# Patient Record
Sex: MALE | Race: WHITE | Employment: PART TIME | URBAN - METROPOLITAN AREA
[De-identification: names, ages, dates, MRNs, and addresses within clinical notes are randomized per-mention and may not be internally consistent; named-entity substitution may affect disease eponyms.]

---

## 2017-04-18 PROBLEM — R60.0 BILATERAL EDEMA OF LOWER EXTREMITY: Status: ACTIVE | Noted: 2017-04-18

## 2017-10-24 PROBLEM — I27.20 PULMONARY HYPERTENSION (HCC): Status: ACTIVE | Noted: 2017-10-24

## 2018-01-03 PROBLEM — I38 VALVULAR HEART DISEASE: Status: ACTIVE | Noted: 2018-01-03

## 2018-09-21 ENCOUNTER — HOSPITAL ENCOUNTER (OUTPATIENT)
Age: 68
Setting detail: SPECIMEN
Discharge: HOME | End: 2018-09-21
Payer: MEDICARE

## 2018-09-25 LAB — SURGICAL PATHOLOGY REPORT: NORMAL

## 2018-10-26 PROBLEM — R93.1 ABNORMAL ECHOCARDIOGRAM: Status: ACTIVE | Noted: 2018-10-26

## 2018-10-26 PROBLEM — I51.7 LAE (LEFT ATRIAL ENLARGEMENT): Status: ACTIVE | Noted: 2018-10-26

## 2018-10-26 PROBLEM — I77.810 DILATED AORTIC ROOT (HCC): Status: ACTIVE | Noted: 2018-10-26

## 2019-04-26 PROBLEM — I51.7 LVH (LEFT VENTRICULAR HYPERTROPHY): Status: ACTIVE | Noted: 2019-04-26

## 2019-06-13 PROBLEM — I48.92 ATRIAL FLUTTER, PAROXYSMAL (HCC): Status: ACTIVE | Noted: 2019-06-13

## 2019-06-13 PROBLEM — Z79.01 CURRENT USE OF LONG TERM ANTICOAGULATION: Status: ACTIVE | Noted: 2019-06-13

## 2020-07-24 PROBLEM — I49.3 PVC (PREMATURE VENTRICULAR CONTRACTION): Status: ACTIVE | Noted: 2020-07-24

## 2020-07-24 PROBLEM — I42.8 NONISCHEMIC CARDIOMYOPATHY (HCC): Status: ACTIVE | Noted: 2020-07-24

## 2020-07-24 PROBLEM — I47.29 NSVT (NONSUSTAINED VENTRICULAR TACHYCARDIA): Status: ACTIVE | Noted: 2020-07-24

## 2023-12-12 RX ORDER — FENOFIBRATE 160 MG/1
160 TABLET ORAL DAILY
Qty: 90 TABLET | Refills: 3 | OUTPATIENT
Start: 2023-12-12

## 2024-02-24 DIAGNOSIS — F51.01 PRIMARY INSOMNIA: Primary | ICD-10-CM

## 2024-02-26 NOTE — TELEPHONE ENCOUNTER
Patient is Out of Medication and leaving today to go to [place] to see his Dtr-in-law who is on life support.  Please send today.

## 2024-02-26 NOTE — TELEPHONE ENCOUNTER
Andrew Restrepo is calling to request a refill on the following medication(s):    Last Visit Date (If Applicable):  Visit date not found    Next Visit Date:    Visit date not found    Medication Request:  Requested Prescriptions     Pending Prescriptions Disp Refills    zolpidem (AMBIEN) 10 MG tablet [Pharmacy Med Name: ZOLPIDEM TARTRATE 10 MG TABLET] 30 tablet 1     Sig: take 1 tablet by mouth once daily at bedtime if needed for insomnia

## 2024-02-27 ENCOUNTER — OFFICE VISIT (OUTPATIENT)
Age: 74
End: 2024-02-27
Payer: MEDICARE

## 2024-02-27 VITALS
HEART RATE: 75 BPM | DIASTOLIC BLOOD PRESSURE: 90 MMHG | HEIGHT: 69 IN | TEMPERATURE: 97.6 F | SYSTOLIC BLOOD PRESSURE: 142 MMHG | OXYGEN SATURATION: 96 % | BODY MASS INDEX: 30.36 KG/M2 | WEIGHT: 205 LBS

## 2024-02-27 DIAGNOSIS — Z96.643 HISTORY OF BILATERAL HIP REPLACEMENTS: ICD-10-CM

## 2024-02-27 DIAGNOSIS — Z00.00 MEDICARE ANNUAL WELLNESS VISIT, SUBSEQUENT: Primary | ICD-10-CM

## 2024-02-27 DIAGNOSIS — Z79.01 ANTICOAGULANT LONG-TERM USE: ICD-10-CM

## 2024-02-27 DIAGNOSIS — R73.9 HYPERGLYCEMIA: ICD-10-CM

## 2024-02-27 DIAGNOSIS — I10 ESSENTIAL HYPERTENSION: ICD-10-CM

## 2024-02-27 DIAGNOSIS — Z95.2 HISTORY OF MITRAL VALVE REPLACEMENT: ICD-10-CM

## 2024-02-27 DIAGNOSIS — E55.9 VITAMIN D DEFICIENCY: ICD-10-CM

## 2024-02-27 DIAGNOSIS — Z95.810 AICD (AUTOMATIC CARDIOVERTER/DEFIBRILLATOR) PRESENT: ICD-10-CM

## 2024-02-27 DIAGNOSIS — E78.2 MIXED HYPERLIPIDEMIA: ICD-10-CM

## 2024-02-27 DIAGNOSIS — I48.11 LONGSTANDING PERSISTENT ATRIAL FIBRILLATION (HCC): ICD-10-CM

## 2024-02-27 DIAGNOSIS — F41.9 ANXIETY: ICD-10-CM

## 2024-02-27 DIAGNOSIS — F51.01 PRIMARY INSOMNIA: ICD-10-CM

## 2024-02-27 DIAGNOSIS — K21.9 GERD WITHOUT ESOPHAGITIS: ICD-10-CM

## 2024-02-27 PROCEDURE — 3080F DIAST BP >= 90 MM HG: CPT | Performed by: INTERNAL MEDICINE

## 2024-02-27 PROCEDURE — G0439 PPPS, SUBSEQ VISIT: HCPCS | Performed by: INTERNAL MEDICINE

## 2024-02-27 PROCEDURE — 3077F SYST BP >= 140 MM HG: CPT | Performed by: INTERNAL MEDICINE

## 2024-02-27 PROCEDURE — 3017F COLORECTAL CA SCREEN DOC REV: CPT | Performed by: INTERNAL MEDICINE

## 2024-02-27 PROCEDURE — 1123F ACP DISCUSS/DSCN MKR DOCD: CPT | Performed by: INTERNAL MEDICINE

## 2024-02-27 PROCEDURE — 99214 OFFICE O/P EST MOD 30 MIN: CPT | Performed by: INTERNAL MEDICINE

## 2024-02-27 PROCEDURE — G8484 FLU IMMUNIZE NO ADMIN: HCPCS | Performed by: INTERNAL MEDICINE

## 2024-02-27 RX ORDER — ZOLPIDEM TARTRATE 10 MG/1
TABLET ORAL
Qty: 5 TABLET | Refills: 0 | Status: SHIPPED | OUTPATIENT
Start: 2024-02-27 | End: 2024-03-01

## 2024-02-27 RX ORDER — SOTALOL HYDROCHLORIDE 80 MG/1
80 TABLET ORAL 2 TIMES DAILY
COMMUNITY

## 2024-02-27 RX ORDER — PANTOPRAZOLE SODIUM 40 MG/1
40 TABLET, DELAYED RELEASE ORAL
Qty: 180 TABLET | Refills: 1 | Status: SHIPPED | OUTPATIENT
Start: 2024-02-27

## 2024-02-27 SDOH — ECONOMIC STABILITY: INCOME INSECURITY: HOW HARD IS IT FOR YOU TO PAY FOR THE VERY BASICS LIKE FOOD, HOUSING, MEDICAL CARE, AND HEATING?: NOT VERY HARD

## 2024-02-27 SDOH — ECONOMIC STABILITY: HOUSING INSECURITY
IN THE LAST 12 MONTHS, WAS THERE A TIME WHEN YOU DID NOT HAVE A STEADY PLACE TO SLEEP OR SLEPT IN A SHELTER (INCLUDING NOW)?: NO

## 2024-02-27 SDOH — ECONOMIC STABILITY: FOOD INSECURITY: WITHIN THE PAST 12 MONTHS, THE FOOD YOU BOUGHT JUST DIDN'T LAST AND YOU DIDN'T HAVE MONEY TO GET MORE.: NEVER TRUE

## 2024-02-27 SDOH — ECONOMIC STABILITY: FOOD INSECURITY: WITHIN THE PAST 12 MONTHS, YOU WORRIED THAT YOUR FOOD WOULD RUN OUT BEFORE YOU GOT MONEY TO BUY MORE.: NEVER TRUE

## 2024-02-27 ASSESSMENT — ENCOUNTER SYMPTOMS
VOMITING: 0
EYE PAIN: 0
CONSTIPATION: 0
WHEEZING: 0
SINUS PAIN: 0
DIARRHEA: 0
SORE THROAT: 0
COUGH: 0
ABDOMINAL PAIN: 0
RHINORRHEA: 0
NAUSEA: 0
SINUS PRESSURE: 0
SHORTNESS OF BREATH: 0
BACK PAIN: 0
BLOOD IN STOOL: 0
EYE REDNESS: 0

## 2024-02-27 ASSESSMENT — PATIENT HEALTH QUESTIONNAIRE - PHQ9
SUM OF ALL RESPONSES TO PHQ QUESTIONS 1-9: 0
1. LITTLE INTEREST OR PLEASURE IN DOING THINGS: 0
2. FEELING DOWN, DEPRESSED OR HOPELESS: 0
SUM OF ALL RESPONSES TO PHQ QUESTIONS 1-9: 0
SUM OF ALL RESPONSES TO PHQ QUESTIONS 1-9: 0
SUM OF ALL RESPONSES TO PHQ9 QUESTIONS 1 & 2: 0
SUM OF ALL RESPONSES TO PHQ QUESTIONS 1-9: 0

## 2024-02-27 ASSESSMENT — LIFESTYLE VARIABLES
HOW OFTEN DO YOU HAVE A DRINK CONTAINING ALCOHOL: NEVER
HOW MANY STANDARD DRINKS CONTAINING ALCOHOL DO YOU HAVE ON A TYPICAL DAY: PATIENT DOES NOT DRINK

## 2024-02-27 NOTE — PATIENT INSTRUCTIONS
you have questions about a medical condition or this instruction, always ask your healthcare professional. Healthwise, [facility] disclaims any warranty or liability for your use of this information.      Personalized Preventive Plan for Andrew Restrepo - 2/27/2024  Medicare offers a range of preventive health benefits. Some of the tests and screenings are paid in full while other may be subject to a deductible, co-insurance, and/or copay.    Some of these benefits include a comprehensive review of your medical history including lifestyle, illnesses that may run in your family, and various assessments and screenings as appropriate.    After reviewing your medical record and screening and assessments performed today your provider may have ordered immunizations, labs, imaging, and/or referrals for you.  A list of these orders (if applicable) as well as your Preventive Care list are included within your After Visit Summary for your review.    Other Preventive Recommendations:    A preventive eye exam performed by an eye specialist is recommended every 1-2 years to screen for glaucoma; cataracts, macular degeneration, and other eye disorders.  A preventive dental visit is recommended every 6 months.  Try to get at least 150 minutes of exercise per week or 10,000 steps per day on a pedometer .  Order or download the FREE \"Exercise & Physical Activity: Your Everyday Guide\" from [employer]. Call 1-216.702.1516 or search [employer] online.  You need 3918-0564 mg of calcium and 6608-1491 IU of vitamin D per day. It is possible to meet your calcium requirement with diet alone, but a vitamin D supplement is usually necessary to meet this goal.  When exposed to the sun, use a sunscreen that protects against both UVA and UVB radiation with an SPF of 30 or greater. Reapply every 2 to 3 hours or after sweating, drying off with a towel, or swimming.  Always wear a seat belt when traveling

## 2024-02-27 NOTE — PROGRESS NOTES
(Temporal)   Ht 1.753 m (5' 9\")   Wt 93 kg (205 lb)   SpO2 96%   BMI 30.27 kg/m²    Physical Exam  Vitals reviewed.   Constitutional:       General: He is not in acute distress.     Appearance: Normal appearance.   HENT:      Head: Normocephalic and atraumatic.      Right Ear: Tympanic membrane and external ear normal.      Left Ear: Tympanic membrane and external ear normal.      Nose: Nose normal.      Mouth/Throat:      Mouth: Mucous membranes are moist.      Pharynx: No oropharyngeal exudate or posterior oropharyngeal erythema.   Eyes:      Extraocular Movements: Extraocular movements intact.      Conjunctiva/sclera: Conjunctivae normal.      Pupils: Pupils are equal, round, and reactive to light.   Neck:      Vascular: No carotid bruit.   Cardiovascular:      Rate and Rhythm: Normal rate and regular rhythm.      Pulses: Normal pulses.      Heart sounds: No murmur heard.     No friction rub. No gallop.   Pulmonary:      Effort: Pulmonary effort is normal. No respiratory distress.      Breath sounds: Normal breath sounds. No wheezing, rhonchi or rales.   Abdominal:      General: Bowel sounds are normal. There is no distension.      Palpations: Abdomen is soft. There is no mass.      Tenderness: There is no abdominal tenderness. There is no guarding.   Musculoskeletal:         General: No swelling or deformity. Normal range of motion.      Cervical back: Normal range of motion and neck supple. No rigidity.   Lymphadenopathy:      Cervical: No cervical adenopathy.   Skin:     General: Skin is warm.      Coloration: Skin is not jaundiced or pale.      Findings: No bruising or rash.   Neurological:      General: No focal deficit present.      Mental Status: He is alert and oriented to person, place, and time.      Cranial Nerves: No cranial nerve deficit.      Motor: No weakness.      Gait: Gait normal.      Deep Tendon Reflexes: Reflexes normal.   Psychiatric:         Mood and Affect: Mood normal.

## 2024-02-27 NOTE — ADDENDUM NOTE
Addended by: BRETT LOMBARDO on: 2/27/2024 03:50 PM     Modules accepted: Orders, Level of Service

## 2024-02-29 RX ORDER — FENOFIBRATE 160 MG/1
160 TABLET ORAL DAILY
Qty: 90 TABLET | Refills: 3 | Status: SHIPPED | OUTPATIENT
Start: 2024-02-29

## 2024-02-29 NOTE — TELEPHONE ENCOUNTER
Andrew Restrepo is calling to request a refill on the following medication(s):    Last Visit Date (If Applicable):  Visit date not found    Next Visit Date:    6/4/2024    Medication Request:  Requested Prescriptions     Pending Prescriptions Disp Refills    fenofibrate (TRIGLIDE) 160 MG tablet [Pharmacy Med Name: Fenofibrate 160 MG Oral Tablet] 90 tablet 3     Sig: TAKE 1 TABLET BY MOUTH DAILY

## 2024-02-29 NOTE — TELEPHONE ENCOUNTER
Patient wants this sent to [facility]       fenofibrate 160 MG tablet  Take 1 tablet by mouth daily, Last Dose:

## 2024-03-01 DIAGNOSIS — F51.01 PRIMARY INSOMNIA: ICD-10-CM

## 2024-03-01 RX ORDER — ZOLPIDEM TARTRATE 10 MG/1
TABLET ORAL
Qty: 30 TABLET | Refills: 2 | Status: SHIPPED | OUTPATIENT
Start: 2024-03-01 | End: 2024-04-01

## 2024-03-01 NOTE — TELEPHONE ENCOUNTER
Patient needs his medication prior to leaving tonight. Please send more than 5 tablets if possible. Patient was just seen in office this week.

## 2024-03-01 NOTE — TELEPHONE ENCOUNTER
Andrew Restrepo is calling to request a refill on the following medication(s):    Last Visit Date (If Applicable):  2/27/2024    Next Visit Date:    Visit date not found    Medication Request:  Requested Prescriptions     Pending Prescriptions Disp Refills    zolpidem (AMBIEN) 10 MG tablet [Pharmacy Med Name: ZOLPIDEM TARTRATE 10 MG TABLET] 30 tablet 1     Sig: take 1 tablet by mouth once daily at bedtime if needed for insomnia

## 2024-04-08 LAB
CHOLESTEROL, TOTAL: 146 MG/DL
CHOLESTEROL/HDL RATIO: 2.8
CREATININE, URINE: 207.31
ESTIMATED AVERAGE GLUCOSE: 146
HBA1C MFR BLD: 6.7 %
HDLC SERPL-MCNC: 52 MG/DL (ref 35–70)
LDL CHOLESTEROL CALCULATED: 70 MG/DL (ref 0–160)
MICROALBUMIN/CREAT 24H UR: 1.3 MG/G{CREAT}
MICROALBUMIN/CREAT UR-RTO: 6.3
NONHDLC SERPL-MCNC: NORMAL MG/DL
TRIGL SERPL-MCNC: 120 MG/DL
TSH SERPL DL<=0.05 MIU/L-ACNC: 4.95 UIU/ML
VITAMIN B-12: 543
VLDLC SERPL CALC-MCNC: 24 MG/DL

## 2024-04-09 DIAGNOSIS — R73.9 HYPERGLYCEMIA: ICD-10-CM

## 2024-04-09 DIAGNOSIS — I10 ESSENTIAL HYPERTENSION: ICD-10-CM

## 2024-04-09 DIAGNOSIS — E11.65 TYPE 2 DIABETES MELLITUS WITH HYPERGLYCEMIA (HCC): ICD-10-CM

## 2024-04-09 DIAGNOSIS — E78.2 MIXED HYPERLIPIDEMIA: ICD-10-CM

## 2024-04-15 RX ORDER — GLUCOSAMINE HCL/CHONDROITIN SU 500-400 MG
CAPSULE ORAL
Qty: 100 STRIP | Refills: 3 | Status: SHIPPED | OUTPATIENT
Start: 2024-04-15

## 2024-04-15 RX ORDER — BLOOD-GLUCOSE METER
1 KIT MISCELLANEOUS DAILY
Qty: 1 KIT | Refills: 0 | Status: SHIPPED | OUTPATIENT
Start: 2024-04-15

## 2024-04-15 RX ORDER — LANCETS 30 GAUGE
1 EACH MISCELLANEOUS DAILY
Qty: 100 EACH | Refills: 3 | Status: SHIPPED | OUTPATIENT
Start: 2024-04-15

## 2024-04-24 DIAGNOSIS — F41.9 ANXIETY: Primary | ICD-10-CM

## 2024-04-24 RX ORDER — CITALOPRAM 40 MG/1
40 TABLET ORAL DAILY
Qty: 90 TABLET | Refills: 1 | Status: SHIPPED | OUTPATIENT
Start: 2024-04-24

## 2024-04-24 NOTE — TELEPHONE ENCOUNTER
Andrew Restrepo is calling to request a refill on the following medication(s):    Last Visit Date (If Applicable):  Visit date not found    Next Visit Date:    6/4/2024    Medication Request:  Requested Prescriptions     Pending Prescriptions Disp Refills    citalopram (CELEXA) 40 MG tablet [Pharmacy Med Name: Citalopram Hydrobromide 40 MG Oral Tablet] 90 tablet 3     Sig: TAKE 1 TABLET BY MOUTH DAILY

## 2024-05-28 RX ORDER — FENOFIBRATE 160 MG/1
160 TABLET ORAL DAILY
Qty: 90 TABLET | Refills: 3 | Status: SHIPPED | OUTPATIENT
Start: 2024-05-28

## 2024-05-28 NOTE — TELEPHONE ENCOUNTER
Andrew Restrepo is calling to request a refill on the following medication(s):    Medication Request:  Requested Prescriptions     Pending Prescriptions Disp Refills    fenofibrate (TRIGLIDE) 160 MG tablet [Pharmacy Med Name: Fenofibrate 160 MG Oral Tablet] 90 tablet 3     Sig: TAKE 1 TABLET BY MOUTH DAILY       Last Visit Date (If Applicable):  2/27/24    Next Visit Date:    6/4/2024

## 2024-05-30 DIAGNOSIS — F51.01 PRIMARY INSOMNIA: ICD-10-CM

## 2024-05-30 RX ORDER — ZOLPIDEM TARTRATE 10 MG/1
10 TABLET ORAL NIGHTLY PRN
Qty: 30 TABLET | Refills: 0 | Status: SHIPPED | OUTPATIENT
Start: 2024-05-30 | End: 2024-06-28

## 2024-05-30 NOTE — TELEPHONE ENCOUNTER
Andrew Restrepo is calling to request a refill on the following medication(s):    Last Visit Date (If Applicable):  2/27/2024    Next Visit Date:    Visit date not found    Medication Request:  Requested Prescriptions     Pending Prescriptions Disp Refills    zolpidem (AMBIEN) 10 MG tablet [Pharmacy Med Name: ZOLPIDEM TARTRATE 10 MG TABLET] 30 tablet      Sig: take 1 tablet by mouth once daily at bedtime if needed for insomnia

## 2024-06-04 ENCOUNTER — OFFICE VISIT (OUTPATIENT)
Age: 74
End: 2024-06-04
Payer: MEDICARE

## 2024-06-04 VITALS
HEART RATE: 62 BPM | HEIGHT: 69 IN | OXYGEN SATURATION: 96 % | SYSTOLIC BLOOD PRESSURE: 135 MMHG | BODY MASS INDEX: 29.62 KG/M2 | WEIGHT: 200 LBS | TEMPERATURE: 97.1 F | DIASTOLIC BLOOD PRESSURE: 80 MMHG

## 2024-06-04 DIAGNOSIS — J32.0 CHRONIC MAXILLARY SINUSITIS: Primary | ICD-10-CM

## 2024-06-04 DIAGNOSIS — F51.01 PRIMARY INSOMNIA: ICD-10-CM

## 2024-06-04 DIAGNOSIS — Z79.01 ANTICOAGULANT LONG-TERM USE: ICD-10-CM

## 2024-06-04 DIAGNOSIS — Z95.810 AICD (AUTOMATIC CARDIOVERTER/DEFIBRILLATOR) PRESENT: ICD-10-CM

## 2024-06-04 DIAGNOSIS — C43.30 MALIGNANT MELANOMA OF FACE EXCLUDING EYELID, NOSE, LIP, AND EAR (HCC): ICD-10-CM

## 2024-06-04 DIAGNOSIS — F41.9 ANXIETY: ICD-10-CM

## 2024-06-04 DIAGNOSIS — E11.9 TYPE 2 DIABETES MELLITUS WITHOUT COMPLICATION, WITHOUT LONG-TERM CURRENT USE OF INSULIN (HCC): ICD-10-CM

## 2024-06-04 DIAGNOSIS — K21.9 GERD WITHOUT ESOPHAGITIS: ICD-10-CM

## 2024-06-04 DIAGNOSIS — I10 ESSENTIAL HYPERTENSION: ICD-10-CM

## 2024-06-04 DIAGNOSIS — B37.0 THRUSH: ICD-10-CM

## 2024-06-04 DIAGNOSIS — E78.2 MIXED HYPERLIPIDEMIA: ICD-10-CM

## 2024-06-04 DIAGNOSIS — Z95.2 HISTORY OF MITRAL VALVE REPLACEMENT: ICD-10-CM

## 2024-06-04 DIAGNOSIS — I48.11 LONGSTANDING PERSISTENT ATRIAL FIBRILLATION (HCC): ICD-10-CM

## 2024-06-04 PROBLEM — I49.3 PVC (PREMATURE VENTRICULAR CONTRACTION): Status: RESOLVED | Noted: 2020-07-24 | Resolved: 2024-06-04

## 2024-06-04 PROBLEM — I51.7 LVH (LEFT VENTRICULAR HYPERTROPHY): Status: RESOLVED | Noted: 2019-04-26 | Resolved: 2024-06-04

## 2024-06-04 PROBLEM — I47.29 NSVT (NONSUSTAINED VENTRICULAR TACHYCARDIA) (HCC): Status: RESOLVED | Noted: 2020-07-24 | Resolved: 2024-06-04

## 2024-06-04 PROBLEM — I51.7 LAE (LEFT ATRIAL ENLARGEMENT): Status: RESOLVED | Noted: 2018-10-26 | Resolved: 2024-06-04

## 2024-06-04 PROBLEM — I77.810 DILATED AORTIC ROOT (HCC): Status: RESOLVED | Noted: 2018-10-26 | Resolved: 2024-06-04

## 2024-06-04 PROBLEM — R93.1 ABNORMAL ECHOCARDIOGRAM: Status: RESOLVED | Noted: 2018-10-26 | Resolved: 2024-06-04

## 2024-06-04 PROBLEM — I48.92 ATRIAL FLUTTER, PAROXYSMAL (HCC): Status: RESOLVED | Noted: 2019-06-13 | Resolved: 2024-06-04

## 2024-06-04 PROBLEM — I38 VALVULAR HEART DISEASE: Status: RESOLVED | Noted: 2018-01-03 | Resolved: 2024-06-04

## 2024-06-04 PROCEDURE — 3079F DIAST BP 80-89 MM HG: CPT | Performed by: PHYSICIAN ASSISTANT

## 2024-06-04 PROCEDURE — 3017F COLORECTAL CA SCREEN DOC REV: CPT | Performed by: PHYSICIAN ASSISTANT

## 2024-06-04 PROCEDURE — 3044F HG A1C LEVEL LT 7.0%: CPT | Performed by: PHYSICIAN ASSISTANT

## 2024-06-04 PROCEDURE — 2022F DILAT RTA XM EVC RTNOPTHY: CPT | Performed by: PHYSICIAN ASSISTANT

## 2024-06-04 PROCEDURE — 99214 OFFICE O/P EST MOD 30 MIN: CPT | Performed by: PHYSICIAN ASSISTANT

## 2024-06-04 PROCEDURE — G8427 DOCREV CUR MEDS BY ELIG CLIN: HCPCS | Performed by: PHYSICIAN ASSISTANT

## 2024-06-04 PROCEDURE — 3075F SYST BP GE 130 - 139MM HG: CPT | Performed by: PHYSICIAN ASSISTANT

## 2024-06-04 PROCEDURE — G8417 CALC BMI ABV UP PARAM F/U: HCPCS | Performed by: PHYSICIAN ASSISTANT

## 2024-06-04 PROCEDURE — 1036F TOBACCO NON-USER: CPT | Performed by: PHYSICIAN ASSISTANT

## 2024-06-04 PROCEDURE — 1123F ACP DISCUSS/DSCN MKR DOCD: CPT | Performed by: PHYSICIAN ASSISTANT

## 2024-06-04 RX ORDER — LOSARTAN POTASSIUM 50 MG/1
50 TABLET ORAL DAILY
COMMUNITY

## 2024-06-04 RX ORDER — ATORVASTATIN CALCIUM 40 MG/1
40 TABLET, FILM COATED ORAL DAILY
COMMUNITY

## 2024-06-04 ASSESSMENT — ENCOUNTER SYMPTOMS
NAUSEA: 0
SHORTNESS OF BREATH: 0
EYE PAIN: 0
SORE THROAT: 0
RHINORRHEA: 0
SINUS PAIN: 0
COUGH: 0
VOMITING: 0
SINUS PRESSURE: 0
BACK PAIN: 0
BLOOD IN STOOL: 0
CONSTIPATION: 0
EYE REDNESS: 0
DIARRHEA: 0
ABDOMINAL PAIN: 0
WHEEZING: 0

## 2024-06-04 NOTE — PROGRESS NOTES
TSH 4.95 uIU/mL   Vitamin B12   Result Value Ref Range    Vitamin B-12 543    Lipid Panel   Result Value Ref Range    Cholesterol, Total 146 mg/dL    HDL 52 35 - 70 mg/dL    LDL Calculated 70 0 - 160 mg/dL    Triglycerides 120 mg/dL    Chol/HDL Ratio 2.8     VLDL 24 mg/dL    Cholesterol non HDL        ASSESSMENT/PLAN     1. Chronic maxillary sinusitis  -     CT SINUS WO CONTRAST; Future  Patient was treated for chronic sinusitis by an ENT in 2021 with several courses of antibiotics that did not really resolve his symptoms.  Will check a CT of the sinuses now to see if there is still any infection.  2. Thrush  -     nystatin (MYCOSTATIN) 205333 UNIT/ML suspension; Take 5 mLs by mouth 4 times daily for 10 days Retain in mouth as long as possible, Oral, 4 TIMES DAILY Starting Tue 6/4/2024, Until Fri 6/14/2024, For 10 days, Disp-200 mL, R-0, Normal  Patient may have thrush on his tongue because there does seem to be thick coating.  He did have Sjogren's antibody test done through ENT a year or 2 ago that was negative that I found on his old [employer] system.  He also recently had vitamin B12 is negative.  3. Malignant melanoma of face excluding eyelid, nose, lip, and ear (HCC)  Recent excision of malignant melanoma by plastic surgeon left temple area  4. Primary insomnia  [place] report done.  Controlled substance contract updated.  No red flags.  Uses Ambien as needed  5. Anxiety  Taking citalopram 40 mg daily  6. Mixed hyperlipidemia  Patient brought his medication bottles with him today the atorvastatin on our chart was listed incorrectly, he is actually on 40 mg daily so chart updated.  He is also on fenofibrate 160 mg daily.  7. Essential hypertension  Losartan was also listed incorrectly he is actually taking 50 mg once a day per Dr. Peggy Ingram.  Also on sotalol 80 mg twice a day  8. Anticoagulant long-term use  Warfarin managed by cardiology  9. Longstanding persistent atrial fibrillation (HCC)  On warfarin per

## 2024-06-06 ENCOUNTER — HOSPITAL ENCOUNTER (OUTPATIENT)
Dept: CT IMAGING | Age: 74
Discharge: HOME | End: 2024-06-08
Attending: PHYSICIAN ASSISTANT
Payer: MEDICARE

## 2024-06-06 DIAGNOSIS — J32.0 CHRONIC MAXILLARY SINUSITIS: ICD-10-CM

## 2024-06-06 PROCEDURE — 70486 CT MAXILLOFACIAL W/O DYE: CPT

## 2024-06-13 ENCOUNTER — TELEPHONE (OUTPATIENT)
Age: 74
End: 2024-06-13

## 2024-06-13 DIAGNOSIS — J32.0 CHRONIC MAXILLARY SINUSITIS: Primary | ICD-10-CM

## 2024-06-13 NOTE — TELEPHONE ENCOUNTER
Patient would like the ENT referral sent to DR Cassius Ornelas.   He does not feel confident with the provider we referred him to.  Please resend to Dr Cassius Ornelas.

## 2024-06-17 ENCOUNTER — TELEPHONE (OUTPATIENT)
Age: 74
End: 2024-06-17

## 2024-06-17 DIAGNOSIS — B37.0 THRUSH: Primary | ICD-10-CM

## 2024-06-17 NOTE — TELEPHONE ENCOUNTER
Patient states that Nystatin 500,000 Units is out of stock at [facility] and the pharmacy does not think they will get it in. Patient would like to know if there is an alternative medication. In the meantime patient will call around to to other pharmacies.    [facility] in [place]  has it for 60 milligrams at 4 times a day.   Phone:[phone]

## 2024-06-17 NOTE — TELEPHONE ENCOUNTER
Patient states that Nystatin 100,000 Units is available at [facility]. Please send to [facility].  Can it still be sent to [facility] or wait till he sees the ENT? Thank you.

## 2024-06-20 ENCOUNTER — TELEPHONE (OUTPATIENT)
Age: 74
End: 2024-06-20

## 2024-06-20 DIAGNOSIS — J32.0 CHRONIC MAXILLARY SINUSITIS: Primary | ICD-10-CM

## 2024-06-20 NOTE — TELEPHONE ENCOUNTER
Patient states that Dr Ornelas moved out of the area about three years ago. Patient would like an ENT Referral sent to Dr Muñiz or anyone within the [facility] system that we recommend.  Please call the patient when ready.

## 2024-06-28 DIAGNOSIS — F51.01 PRIMARY INSOMNIA: ICD-10-CM

## 2024-06-28 RX ORDER — ZOLPIDEM TARTRATE 10 MG/1
10 TABLET ORAL NIGHTLY PRN
Qty: 30 TABLET | Refills: 3 | Status: SHIPPED | OUTPATIENT
Start: 2024-06-28 | End: 2024-10-26

## 2024-06-28 NOTE — TELEPHONE ENCOUNTER
Andrew Restrepo is calling to request a refill on the following medication(s):    Last Visit Date (If Applicable):  6/4/2024    Next Visit Date:    9/6/2024    Medication Request:  Requested Prescriptions     Pending Prescriptions Disp Refills    zolpidem (AMBIEN) 10 MG tablet [Pharmacy Med Name: ZOLPIDEM TARTRATE 10 MG TABLET] 30 tablet 0     Sig: Take 1 tablet by mouth nightly as needed for Sleep. Max Daily Amount: 10 mg

## 2024-08-27 ENCOUNTER — TELEMEDICINE (OUTPATIENT)
Age: 74
End: 2024-08-27
Payer: MEDICARE

## 2024-08-27 DIAGNOSIS — E78.2 MIXED HYPERLIPIDEMIA: ICD-10-CM

## 2024-08-27 DIAGNOSIS — U07.1 COVID-19: Primary | ICD-10-CM

## 2024-08-27 DIAGNOSIS — F51.01 PRIMARY INSOMNIA: ICD-10-CM

## 2024-08-27 DIAGNOSIS — Z79.01 ANTICOAGULANT LONG-TERM USE: ICD-10-CM

## 2024-08-27 PROCEDURE — G8428 CUR MEDS NOT DOCUMENT: HCPCS | Performed by: INTERNAL MEDICINE

## 2024-08-27 PROCEDURE — 3017F COLORECTAL CA SCREEN DOC REV: CPT | Performed by: INTERNAL MEDICINE

## 2024-08-27 PROCEDURE — G8417 CALC BMI ABV UP PARAM F/U: HCPCS | Performed by: INTERNAL MEDICINE

## 2024-08-27 PROCEDURE — 1123F ACP DISCUSS/DSCN MKR DOCD: CPT | Performed by: INTERNAL MEDICINE

## 2024-08-27 PROCEDURE — 99213 OFFICE O/P EST LOW 20 MIN: CPT | Performed by: INTERNAL MEDICINE

## 2024-08-27 PROCEDURE — 1036F TOBACCO NON-USER: CPT | Performed by: INTERNAL MEDICINE

## 2024-08-27 ASSESSMENT — ENCOUNTER SYMPTOMS
SINUS PRESSURE: 0
SINUS PAIN: 0
EYE REDNESS: 0
ABDOMINAL PAIN: 0
BLOOD IN STOOL: 0
BACK PAIN: 0
NAUSEA: 0
WHEEZING: 0
SORE THROAT: 0
VOMITING: 0
COUGH: 0
DIARRHEA: 0
RHINORRHEA: 0
SHORTNESS OF BREATH: 0
EYE PAIN: 0
CONSTIPATION: 0

## 2024-08-27 NOTE — PROGRESS NOTES
Andrew Restrepo, was evaluated through a synchronous (real-time) audio-video encounter. The patient (or guardian if applicable) is aware that this is a billable service, which includes applicable co-pays. This Virtual Visit was conducted with patient's (and/or legal guardian's) consent. Patient identification was verified, and a caregiver was present when appropriate.   The patient was located at Home: [address] [place] [address]  Provider was located at Facility (Appt Dept): [address] [place] [address]  Confirm you are appropriately licensed, registered, or certified to deliver care in the state where the patient is located as indicated above. If you are not or unsure, please re-schedule the visit: Yes, I confirm.     Andrew Restrepo (:  1950) is a Established patient, presenting virtually for evaluation of the following:      Below is the assessment and plan developed based on review of pertinent history, physical exam, labs, studies, and medications.     Assessment & Plan  COVID-19     Start Paxlovid 3 p.o. twice daily x 5 days.  Side effects discussed.  Tylenol as needed, monitor O2 sat and if less than 90% call.  Rest, chicken soup, honey.  Call if not better  Orders:    nirmatrelvir/ritonavir 300/100 (PAXLOVID, 300/100,) 20 x 150 MG & 10 x 100MG TBPK; Take 3 tablets (two 150 mg nirmatrelvir and one 100 mg ritonavir tablets) by mouth every 12 hours for 5 days.    Mixed hyperlipidemia     Hold atorvastatin x 2 weeks       Primary insomnia     Decrease zolpidem from 10 down to 5 mg nightly x 2 weeks       Anticoagulant long-term use     Check INR on Friday         No follow-ups on file.       Subjective   HPI  Covid sxs started . Myalgia, head throbbing, sinus congestion , coughing.  Rx tylenol  No covid exposures  GFR > 60  Tested positive for COVID .  [facility] and he will have me to do this visit over the phone    Review

## 2024-09-06 ENCOUNTER — OFFICE VISIT (OUTPATIENT)
Age: 74
End: 2024-09-06

## 2024-09-06 VITALS
HEIGHT: 69 IN | OXYGEN SATURATION: 98 % | WEIGHT: 197 LBS | BODY MASS INDEX: 29.18 KG/M2 | HEART RATE: 62 BPM | SYSTOLIC BLOOD PRESSURE: 138 MMHG | DIASTOLIC BLOOD PRESSURE: 86 MMHG | TEMPERATURE: 98.1 F

## 2024-09-06 DIAGNOSIS — F41.9 ANXIETY: ICD-10-CM

## 2024-09-06 DIAGNOSIS — Z86.16 HISTORY OF COVID-19: ICD-10-CM

## 2024-09-06 DIAGNOSIS — E78.2 MIXED HYPERLIPIDEMIA: ICD-10-CM

## 2024-09-06 DIAGNOSIS — E11.9 ENCOUNTER FOR DIABETIC FOOT EXAM (HCC): ICD-10-CM

## 2024-09-06 DIAGNOSIS — Z23 IMMUNIZATION DUE: ICD-10-CM

## 2024-09-06 DIAGNOSIS — E55.9 VITAMIN D DEFICIENCY: ICD-10-CM

## 2024-09-06 DIAGNOSIS — B37.0 THRUSH: ICD-10-CM

## 2024-09-06 DIAGNOSIS — Z79.01 ANTICOAGULANT LONG-TERM USE: ICD-10-CM

## 2024-09-06 DIAGNOSIS — E11.9 TYPE 2 DIABETES MELLITUS WITHOUT COMPLICATION, WITHOUT LONG-TERM CURRENT USE OF INSULIN (HCC): ICD-10-CM

## 2024-09-06 DIAGNOSIS — Z13.6 ENCOUNTER FOR ABDOMINAL AORTIC ANEURYSM SCREENING: ICD-10-CM

## 2024-09-06 DIAGNOSIS — C43.30 MALIGNANT MELANOMA OF FACE EXCLUDING EYELID, NOSE, LIP, AND EAR (HCC): ICD-10-CM

## 2024-09-06 DIAGNOSIS — I48.11 LONGSTANDING PERSISTENT ATRIAL FIBRILLATION (HCC): ICD-10-CM

## 2024-09-06 DIAGNOSIS — J32.0 CHRONIC MAXILLARY SINUSITIS: ICD-10-CM

## 2024-09-06 DIAGNOSIS — Z95.2 HISTORY OF MITRAL VALVE REPLACEMENT: ICD-10-CM

## 2024-09-06 DIAGNOSIS — Z95.810 AICD (AUTOMATIC CARDIOVERTER/DEFIBRILLATOR) PRESENT: ICD-10-CM

## 2024-09-06 DIAGNOSIS — I10 ESSENTIAL HYPERTENSION: ICD-10-CM

## 2024-09-06 DIAGNOSIS — F51.01 PRIMARY INSOMNIA: Primary | ICD-10-CM

## 2024-09-06 DIAGNOSIS — K21.9 GERD WITHOUT ESOPHAGITIS: ICD-10-CM

## 2024-09-06 PROBLEM — I50.22 CHRONIC SYSTOLIC (CONGESTIVE) HEART FAILURE (HCC): Status: ACTIVE | Noted: 2024-09-06

## 2024-09-06 RX ORDER — ZOLPIDEM TARTRATE 10 MG/1
10 TABLET ORAL NIGHTLY PRN
Qty: 30 TABLET | Refills: 3 | Status: SHIPPED | OUTPATIENT
Start: 2024-09-06 | End: 2025-01-04

## 2024-09-06 ASSESSMENT — ENCOUNTER SYMPTOMS
EYE PAIN: 0
SINUS PRESSURE: 0
SHORTNESS OF BREATH: 0
CONSTIPATION: 0
NAUSEA: 0
DIARRHEA: 0
SORE THROAT: 0
RHINORRHEA: 0
EYE REDNESS: 0
COUGH: 0
BACK PAIN: 0
VOMITING: 0
SINUS PAIN: 0
ABDOMINAL PAIN: 0
WHEEZING: 0
BLOOD IN STOOL: 0

## 2024-09-06 NOTE — PROGRESS NOTES
movements intact.      Conjunctiva/sclera: Conjunctivae normal.      Pupils: Pupils are equal, round, and reactive to light.   Neck:      Vascular: No carotid bruit.   Cardiovascular:      Rate and Rhythm: Normal rate and regular rhythm.      Pulses: Normal pulses.      Heart sounds: No murmur heard.     No friction rub. No gallop.      Comments: Heart clicking heard best at apex  Pulmonary:      Effort: Pulmonary effort is normal. No respiratory distress.      Breath sounds: Normal breath sounds. No wheezing, rhonchi or rales.   Abdominal:      General: Bowel sounds are normal. There is no distension.      Palpations: Abdomen is soft. There is no mass.      Tenderness: There is no abdominal tenderness. There is no guarding.   Musculoskeletal:         General: No swelling or deformity. Normal range of motion.      Cervical back: Normal range of motion and neck supple. No rigidity.   Lymphadenopathy:      Cervical: No cervical adenopathy.   Skin:     General: Skin is warm.      Coloration: Skin is not jaundiced or pale.      Findings: No bruising or rash.      Comments: 5 cm healing wound on L dorsum of foot   Neurological:      General: No focal deficit present.      Mental Status: He is alert and oriented to person, place, and time.      Cranial Nerves: No cranial nerve deficit.      Motor: No weakness.      Gait: Gait normal.      Deep Tendon Reflexes: Reflexes normal.   Psychiatric:         Mood and Affect: Mood normal.         Thought Content: Thought content normal.       The 10-year ASCVD risk score (Ebony VELASCO, et al., 2019) is: 47.9%    Values used to calculate the score:      Age: 73 years      Sex: Male      Is Non- : No      Diabetic: Yes      Tobacco smoker: No      Systolic Blood Pressure: 138 mmHg      Is BP treated: Yes      HDL Cholesterol: 52 mg/dL      Total Cholesterol: 146 mg/dL     Results for orders placed or performed in visit on 04/09/24   Hemoglobin A1C   Result Value

## 2024-09-11 LAB
ANION GAP, EXTERNAL: 8
BUN, EXTERNAL: 16
BUN/CREATININE RATIO, EXTERNAL: NORMAL
CALCIUM, EXTERNAL: NORMAL
CHLORIDE, EXTERNAL: 107
CO2, EXTERNAL: 24
CREATININE, EXTERNAL: 0.86
EGFR IF AFA, EXTERNAL: NORMAL
EGFR IF NONAFRICAN AMERICAN: >90
GLUCOSE SER, EXTERNAL: 107
POTASSIUM, EXTERNAL: 4
SODIUM, EXTERNAL: 139

## 2024-09-13 ENCOUNTER — TELEPHONE (OUTPATIENT)
Age: 74
End: 2024-09-13

## 2024-09-13 DIAGNOSIS — N18.2 CONTROLLED TYPE 2 DIABETES MELLITUS WITH STAGE 2 CHRONIC KIDNEY DISEASE, WITHOUT LONG-TERM CURRENT USE OF INSULIN (HCC): Primary | ICD-10-CM

## 2024-09-13 DIAGNOSIS — E11.22 CONTROLLED TYPE 2 DIABETES MELLITUS WITH STAGE 2 CHRONIC KIDNEY DISEASE, WITHOUT LONG-TERM CURRENT USE OF INSULIN (HCC): Primary | ICD-10-CM

## 2024-09-13 NOTE — TELEPHONE ENCOUNTER
Patient just had his labs done on Wednesday and his was Glucose 6.7. Labs were done by [facility]. Patient states he has been eating  ight and exercising and glucose will still not come down.Patient was here last week for an office visit and would like some medication to control his diabetes if he is considered Diabetic.   Please Advise.

## 2024-09-19 DIAGNOSIS — F41.9 ANXIETY: ICD-10-CM

## 2024-09-19 RX ORDER — CITALOPRAM HYDROBROMIDE 40 MG/1
40 TABLET ORAL DAILY
Qty: 90 TABLET | Refills: 1 | Status: SHIPPED | OUTPATIENT
Start: 2024-09-19

## 2024-09-26 DIAGNOSIS — F51.01 PRIMARY INSOMNIA: ICD-10-CM

## 2024-09-27 RX ORDER — ZOLPIDEM TARTRATE 10 MG/1
10 TABLET ORAL NIGHTLY PRN
Qty: 30 TABLET | Refills: 1 | Status: SHIPPED | OUTPATIENT
Start: 2024-09-27 | End: 2025-01-25

## 2024-10-02 DIAGNOSIS — E11.9 TYPE 2 DIABETES MELLITUS WITHOUT COMPLICATION, WITHOUT LONG-TERM CURRENT USE OF INSULIN (HCC): ICD-10-CM

## 2024-10-02 DIAGNOSIS — E78.2 MIXED HYPERLIPIDEMIA: ICD-10-CM

## 2024-10-02 DIAGNOSIS — E55.9 VITAMIN D DEFICIENCY: ICD-10-CM

## 2024-10-03 RX ORDER — METFORMIN HCL 500 MG
500 TABLET, EXTENDED RELEASE 24 HR ORAL
Qty: 90 TABLET | Refills: 1 | Status: SHIPPED | OUTPATIENT
Start: 2024-10-03

## 2024-10-04 NOTE — TELEPHONE ENCOUNTER
A1C is better but still borderline diabetic. Cont low Carb diet, exercise, wt loss.  Add metformin 500 mg qd with supper. A1C again in 3 mo with vit B12 level.

## 2024-10-04 NOTE — TELEPHONE ENCOUNTER
Called Patient left message for Patient to call the office back.  Patient called back and informed.

## 2024-10-04 NOTE — TELEPHONE ENCOUNTER
Patient called back and informed of Dr Grimaldo's response. Patient wanted labs mailed to home address.

## 2024-10-28 ENCOUNTER — TELEPHONE (OUTPATIENT)
Age: 74
End: 2024-10-28

## 2024-10-28 DIAGNOSIS — F51.01 PRIMARY INSOMNIA: ICD-10-CM

## 2024-10-28 RX ORDER — ZOLPIDEM TARTRATE 10 MG/1
10 TABLET ORAL NIGHTLY PRN
Qty: 30 TABLET | Refills: 2 | Status: SHIPPED | OUTPATIENT
Start: 2024-10-28 | End: 2025-02-25

## 2024-10-28 NOTE — TELEPHONE ENCOUNTER
Patient would like a refill of Zolpidem 10 MG. PRN nightly for sleep.    Please send to [facility].

## 2024-10-28 NOTE — TELEPHONE ENCOUNTER
Andrew Restrepo is calling to request a refill on the following medication(s):    Last Visit Date (If Applicable):  9/6/2024    Next Visit Date:    3/6/2025    Medication Request:  Requested Prescriptions     Pending Prescriptions Disp Refills    zolpidem (AMBIEN) 10 MG tablet 30 tablet 0     Sig: Take 1 tablet by mouth nightly as needed for Sleep for up to 120 days. Max Daily Amount: 10 mg

## 2024-10-29 ENCOUNTER — TELEPHONE (OUTPATIENT)
Age: 74
End: 2024-10-29

## 2024-10-29 NOTE — TELEPHONE ENCOUNTER
Received fax from [facility] ([phone])) Requesting supporting documentation for diabetic supplies for a Medicare Pharmacy audit to [phone].    After confirming with Mr. Pollock that he did received diabetic supplies from [facility] in April, I faxed the requested records.

## 2024-11-01 DIAGNOSIS — E11.22 CONTROLLED TYPE 2 DIABETES MELLITUS WITH STAGE 2 CHRONIC KIDNEY DISEASE, WITHOUT LONG-TERM CURRENT USE OF INSULIN (HCC): ICD-10-CM

## 2024-11-01 DIAGNOSIS — N18.2 CONTROLLED TYPE 2 DIABETES MELLITUS WITH STAGE 2 CHRONIC KIDNEY DISEASE, WITHOUT LONG-TERM CURRENT USE OF INSULIN (HCC): ICD-10-CM

## 2024-12-30 ENCOUNTER — OFFICE VISIT (OUTPATIENT)
Age: 74
End: 2024-12-30
Payer: MEDICARE

## 2024-12-30 VITALS
HEART RATE: 71 BPM | OXYGEN SATURATION: 96 % | BODY MASS INDEX: 28.71 KG/M2 | SYSTOLIC BLOOD PRESSURE: 140 MMHG | WEIGHT: 193.8 LBS | TEMPERATURE: 97.7 F | HEIGHT: 69 IN | DIASTOLIC BLOOD PRESSURE: 80 MMHG

## 2024-12-30 DIAGNOSIS — E11.9 TYPE 2 DIABETES MELLITUS WITHOUT COMPLICATION, WITHOUT LONG-TERM CURRENT USE OF INSULIN (HCC): ICD-10-CM

## 2024-12-30 DIAGNOSIS — J20.8 ACUTE BRONCHITIS DUE TO OTHER SPECIFIED ORGANISMS: Primary | ICD-10-CM

## 2024-12-30 LAB
INFLUENZA A ANTIBODY: NEGATIVE
INFLUENZA B ANTIBODY: NEGATIVE

## 2024-12-30 PROCEDURE — 99214 OFFICE O/P EST MOD 30 MIN: CPT | Performed by: PHYSICIAN ASSISTANT

## 2024-12-30 PROCEDURE — G8427 DOCREV CUR MEDS BY ELIG CLIN: HCPCS | Performed by: PHYSICIAN ASSISTANT

## 2024-12-30 PROCEDURE — 87804 INFLUENZA ASSAY W/OPTIC: CPT | Performed by: PHYSICIAN ASSISTANT

## 2024-12-30 PROCEDURE — 1036F TOBACCO NON-USER: CPT | Performed by: PHYSICIAN ASSISTANT

## 2024-12-30 PROCEDURE — G8417 CALC BMI ABV UP PARAM F/U: HCPCS | Performed by: PHYSICIAN ASSISTANT

## 2024-12-30 PROCEDURE — 1123F ACP DISCUSS/DSCN MKR DOCD: CPT | Performed by: PHYSICIAN ASSISTANT

## 2024-12-30 PROCEDURE — 1160F RVW MEDS BY RX/DR IN RCRD: CPT | Performed by: PHYSICIAN ASSISTANT

## 2024-12-30 PROCEDURE — 3044F HG A1C LEVEL LT 7.0%: CPT | Performed by: PHYSICIAN ASSISTANT

## 2024-12-30 PROCEDURE — G8482 FLU IMMUNIZE ORDER/ADMIN: HCPCS | Performed by: PHYSICIAN ASSISTANT

## 2024-12-30 PROCEDURE — 3079F DIAST BP 80-89 MM HG: CPT | Performed by: PHYSICIAN ASSISTANT

## 2024-12-30 PROCEDURE — 3077F SYST BP >= 140 MM HG: CPT | Performed by: PHYSICIAN ASSISTANT

## 2024-12-30 PROCEDURE — 1159F MED LIST DOCD IN RCRD: CPT | Performed by: PHYSICIAN ASSISTANT

## 2024-12-30 RX ORDER — AZITHROMYCIN 250 MG/1
250 TABLET, FILM COATED ORAL SEE ADMIN INSTRUCTIONS
Qty: 6 TABLET | Refills: 0 | Status: SHIPPED | OUTPATIENT
Start: 2024-12-30 | End: 2025-01-04

## 2024-12-30 ASSESSMENT — ENCOUNTER SYMPTOMS
COUGH: 1
RHINORRHEA: 0
SINUS PRESSURE: 1
NAUSEA: 1
SHORTNESS OF BREATH: 0
VOMITING: 0
BACK PAIN: 0
EYE REDNESS: 0
CONSTIPATION: 0
SORE THROAT: 1
EYE PAIN: 0
WHEEZING: 0
SINUS PAIN: 1
ABDOMINAL PAIN: 0
BLOOD IN STOOL: 0
DIARRHEA: 0

## 2024-12-30 NOTE — PROGRESS NOTES
[facility]     Date of Visit:  2024  Patient Name: Andrew Restrepo   Patient :  1950     CHIEF COMPLAINT:     Andrew Restrepo is a 74 y.o. male who presents today for an general visit to be evaluated for the following condition(s):  Chief Complaint   Patient presents with    Cough     Started on  or , after seeing his grandchildren.     Headache    Generalized Body Aches       HISTORY OF PRESENT ILLNESS      Headaches, swollen glands, nausea, tired. Cough with yellow sputum.  Symptoms started 1 week ago.  Grandkids are all sick with runny noses and upper respiratory.  Symptoms are mostly URI/sinus.  He did have sinus surgery in September and was doing very well until recently.  No shortness of breath.  No fever or chills.  No chest pains.  No bleeding problems from his Coumadin.    REVIEW OF SYSTEMS     Review of Systems   Constitutional:  Positive for fatigue. Negative for chills, fever and unexpected weight change.   HENT:  Positive for congestion, sinus pressure, sinus pain and sore throat. Negative for ear pain, hearing loss, rhinorrhea and sneezing.    Eyes:  Negative for pain, redness and visual disturbance.   Respiratory:  Positive for cough. Negative for shortness of breath and wheezing.    Cardiovascular:  Negative for chest pain, palpitations and leg swelling.   Gastrointestinal:  Positive for nausea. Negative for abdominal pain, blood in stool, constipation, diarrhea and vomiting.   Endocrine: Negative for cold intolerance and heat intolerance.   Genitourinary:  Negative for difficulty urinating, dysuria, frequency, hematuria and urgency.   Musculoskeletal:  Negative for arthralgias, back pain, gait problem, joint swelling, myalgias and neck pain.   Skin:  Negative for rash and wound.   Allergic/Immunologic: Negative for immunocompromised state.   Neurological:  Positive for headaches. Negative for dizziness, tremors, seizures, syncope, speech difficulty, weakness,

## 2025-01-15 LAB
CREATININE URINE: 112 MG/DL
ESTIMATED AVERAGE GLUCOSE: 140
HBA1C MFR BLD: 6.5 %
MICROALBUMIN/CREAT 24H UR: 0.8 MG/DL
MICROALBUMIN/CREAT UR-RTO: 7.1 MG/G

## 2025-01-16 DIAGNOSIS — E11.9 TYPE 2 DIABETES MELLITUS WITHOUT COMPLICATION, WITHOUT LONG-TERM CURRENT USE OF INSULIN (HCC): ICD-10-CM

## 2025-02-05 DIAGNOSIS — F41.9 ANXIETY: ICD-10-CM

## 2025-02-05 RX ORDER — CITALOPRAM HYDROBROMIDE 40 MG/1
40 TABLET ORAL DAILY
Qty: 90 TABLET | Refills: 1 | Status: SHIPPED | OUTPATIENT
Start: 2025-02-05

## 2025-02-05 NOTE — TELEPHONE ENCOUNTER
Andrew Restrepo is calling to request a refill on the following medication(s):    Last Visit Date (If Applicable):  12/30/2024    Next Visit Date:    3/6/2025    Medication Request:  Requested Prescriptions     Pending Prescriptions Disp Refills    citalopram (CELEXA) 40 MG tablet [Pharmacy Med Name: Citalopram Hydrobromide 40 MG Oral Tablet] 90 tablet 3     Sig: TAKE 1 TABLET BY MOUTH DAILY

## 2025-03-06 ENCOUNTER — OFFICE VISIT (OUTPATIENT)
Age: 75
End: 2025-03-06

## 2025-03-06 VITALS
SYSTOLIC BLOOD PRESSURE: 144 MMHG | DIASTOLIC BLOOD PRESSURE: 90 MMHG | HEART RATE: 69 BPM | WEIGHT: 200.4 LBS | TEMPERATURE: 97 F | BODY MASS INDEX: 29.68 KG/M2 | OXYGEN SATURATION: 96 % | HEIGHT: 69 IN

## 2025-03-06 DIAGNOSIS — I50.22 CHRONIC SYSTOLIC (CONGESTIVE) HEART FAILURE (HCC): ICD-10-CM

## 2025-03-06 DIAGNOSIS — E11.9 TYPE 2 DIABETES MELLITUS WITHOUT COMPLICATION, WITHOUT LONG-TERM CURRENT USE OF INSULIN (HCC): Primary | ICD-10-CM

## 2025-03-06 DIAGNOSIS — I48.11 LONGSTANDING PERSISTENT ATRIAL FIBRILLATION (HCC): ICD-10-CM

## 2025-03-06 DIAGNOSIS — F41.9 ANXIETY: ICD-10-CM

## 2025-03-06 DIAGNOSIS — C43.30 MALIGNANT MELANOMA OF FACE EXCLUDING EYELID, NOSE, LIP, AND EAR (HCC): ICD-10-CM

## 2025-03-06 DIAGNOSIS — E78.2 MIXED HYPERLIPIDEMIA: ICD-10-CM

## 2025-03-06 DIAGNOSIS — I10 ESSENTIAL HYPERTENSION: ICD-10-CM

## 2025-03-06 DIAGNOSIS — Z95.2 HISTORY OF MITRAL VALVE REPLACEMENT: ICD-10-CM

## 2025-03-06 DIAGNOSIS — Z95.810 AICD (AUTOMATIC CARDIOVERTER/DEFIBRILLATOR) PRESENT: ICD-10-CM

## 2025-03-06 SDOH — ECONOMIC STABILITY: FOOD INSECURITY: WITHIN THE PAST 12 MONTHS, YOU WORRIED THAT YOUR FOOD WOULD RUN OUT BEFORE YOU GOT MONEY TO BUY MORE.: NEVER TRUE

## 2025-03-06 SDOH — ECONOMIC STABILITY: FOOD INSECURITY: WITHIN THE PAST 12 MONTHS, THE FOOD YOU BOUGHT JUST DIDN'T LAST AND YOU DIDN'T HAVE MONEY TO GET MORE.: NEVER TRUE

## 2025-03-06 ASSESSMENT — ENCOUNTER SYMPTOMS
RHINORRHEA: 0
BACK PAIN: 0
NAUSEA: 0
SHORTNESS OF BREATH: 0
COUGH: 0
VOMITING: 0
SINUS PRESSURE: 0
EYE PAIN: 0
SINUS PAIN: 0
BLOOD IN STOOL: 0
SORE THROAT: 0
DIARRHEA: 0
EYE REDNESS: 0
ABDOMINAL PAIN: 0
CONSTIPATION: 0
WHEEZING: 0

## 2025-03-06 ASSESSMENT — PATIENT HEALTH QUESTIONNAIRE - PHQ9
SUM OF ALL RESPONSES TO PHQ QUESTIONS 1-9: 0
2. FEELING DOWN, DEPRESSED OR HOPELESS: NOT AT ALL
1. LITTLE INTEREST OR PLEASURE IN DOING THINGS: NOT AT ALL
SUM OF ALL RESPONSES TO PHQ QUESTIONS 1-9: 0

## 2025-03-06 NOTE — PROGRESS NOTES
Hemoglobin A1C; Future  Last A1c excellent at 6.5.  He is taking the metformin 500 mg daily without any problems.  Encouraged him to monitor his blood sugars daily.  Stick to diabetic diet and try to exercise more.  Check labs again prior to next visit in 6 months.  Also his urine microalbumin was reviewed and creatinine and GFR were in normal range.  2. Chronic systolic (congestive) heart failure (HCC)  Asymptomatic.  Keep appointment with cardiology.  3. Longstanding persistent atrial fibrillation (HCC)  Heart is RRR on exam.  He has had an ablation in the past.  He is still on warfarin.  Also on sotalol 80 mg twice a day that controls his rate.  4. Malignant melanoma of face excluding eyelid, nose, lip, and ear (HCC)  Patient reports that [facility] 6 [facility] for a full-body skin  5. AICD (automatic cardioverter/defibrillator) present  No firings.  Keep appointment with cardiology  6. Essential hypertension  Blood pressure is a little bit elevated in the office today.  However patient got to his appointment late and was flustered about that.  He is on losartan 50 mg daily, sotalol 80 mg twice a day.  No changes at this time.  7. Mixed hyperlipidemia  Continue atorvastatin 40 mg daily and fenofibrate 160 mg daily  8. Anxiety  Continue citalopram 40 mg daily that seems to control symptoms well  9. History of mitral valve replacement  Keep follow-up with cardiology.  He is on warfarin 5 mg without any bleeding problems.    Return in about 6 months (around 9/6/2025) for mwv.    COMMUNICATION:       Electronically signed by Letty Temple PA-C on 3/6/2025 at 2:20 PM

## 2025-03-26 DIAGNOSIS — F51.01 PRIMARY INSOMNIA: ICD-10-CM

## 2025-03-26 RX ORDER — ZOLPIDEM TARTRATE 10 MG/1
5 TABLET ORAL NIGHTLY PRN
Qty: 30 TABLET | Refills: 0 | Status: SHIPPED | OUTPATIENT
Start: 2025-03-26 | End: 2025-04-25

## 2025-03-26 NOTE — TELEPHONE ENCOUNTER
Andrew Restrepo is calling to request a refill on the following medication(s):    Last Visit Date (If Applicable):  3/6/2025    Next Visit Date:    9/8/2025    Medication Request:  Requested Prescriptions     Pending Prescriptions Disp Refills    zolpidem (AMBIEN) 10 MG tablet [Pharmacy Med Name: ZOLPIDEM TARTRATE 10 MG TAB 10 Tablet] 30 tablet 0     Sig: Take 0.5 tablets by mouth nightly as needed for Sleep for up to 30 days. Max Daily Amount: 5 mg

## 2025-03-31 DIAGNOSIS — N18.2 CONTROLLED TYPE 2 DIABETES MELLITUS WITH STAGE 2 CHRONIC KIDNEY DISEASE, WITHOUT LONG-TERM CURRENT USE OF INSULIN (HCC): ICD-10-CM

## 2025-03-31 DIAGNOSIS — E11.22 CONTROLLED TYPE 2 DIABETES MELLITUS WITH STAGE 2 CHRONIC KIDNEY DISEASE, WITHOUT LONG-TERM CURRENT USE OF INSULIN (HCC): ICD-10-CM

## 2025-03-31 RX ORDER — METFORMIN HYDROCHLORIDE 500 MG/1
500 TABLET, EXTENDED RELEASE ORAL
Qty: 90 TABLET | Refills: 1 | Status: SHIPPED | OUTPATIENT
Start: 2025-03-31

## 2025-03-31 NOTE — TELEPHONE ENCOUNTER
Andrew Restrepo is calling to request a refill on the following medication(s):    Last Visit Date (If Applicable):  9/6/2024    Next Visit Date:    9/8/2025    Medication Request:  Requested Prescriptions     Pending Prescriptions Disp Refills    metFORMIN (GLUCOPHAGE-XR) 500 MG extended release tablet [Pharmacy Med Name: METFORMIN HCL  MG TB2 500 Tablet] 90 tablet 1     Sig: TAKE 1 TABLET BY MOUTH DAILY WITH SUPPER

## 2025-04-01 DIAGNOSIS — E78.5 HYPERLIPIDEMIA, UNSPECIFIED HYPERLIPIDEMIA TYPE: Primary | ICD-10-CM

## 2025-04-01 RX ORDER — FENOFIBRATE 160 MG/1
160 TABLET ORAL DAILY
Qty: 90 TABLET | Refills: 3 | Status: SHIPPED | OUTPATIENT
Start: 2025-04-01

## 2025-04-01 NOTE — TELEPHONE ENCOUNTER
Andrew Restrepo is calling to request a refill on the following medication(s):    Last Visit Date (If Applicable):  3/6/2025    Next Visit Date:    9/8/2025    Medication Request:  Requested Prescriptions     Pending Prescriptions Disp Refills    fenofibrate 160 MG tablet [Pharmacy Med Name: Fenofibrate 160 MG Oral Tablet] 90 tablet 3     Sig: Take 1 tablet by mouth daily

## 2025-04-21 ENCOUNTER — TELEPHONE (OUTPATIENT)
Age: 75
End: 2025-04-21

## 2025-04-21 RX ORDER — AZITHROMYCIN 250 MG/1
250 TABLET, FILM COATED ORAL SEE ADMIN INSTRUCTIONS
Qty: 6 TABLET | Refills: 0 | Status: SHIPPED | OUTPATIENT
Start: 2025-04-21 | End: 2025-04-26

## 2025-04-21 NOTE — TELEPHONE ENCOUNTER
Patient is calling with symptoms of sinus infection.  Blowing green mucous, Headache with pressure behind his eyes.  Ear pain is starting.  No Fever, No Cough.  He is asking for a script to be sent to [facility] in [place].  He said Letty told him to call for RX since he has a chronic problem.   NKWILMAN

## 2025-04-24 ENCOUNTER — TELEPHONE (OUTPATIENT)
Dept: PHARMACY | Facility: CLINIC | Age: 75
End: 2025-04-24

## 2025-04-24 NOTE — TELEPHONE ENCOUNTER
[facility] CLINICAL PHARMACY: HEART FAILURE TREATMENT REVIEW  As part of [facility]'s efforts to reduce heart failure (HF) treatment gaps across the healthcare continuum, the organization is focused on improving alignment with guideline-directed medical therapy (GDMT) for heart failure with reduced ejection fraction (HFrEF).    TRIPLE GDMT GAP IDENTIFIED  Andrew Restrepo is an adult with a diagnosis of HF with a current or previous left ventricular ejection fraction (LVEF) of <=40% who is NOT prescribed THREE evidence-based medicines for HF as reflected in the Epic ambulatory medication list.     Allergies   Allergen Reactions    Benzocaine Other reaction(s): Intolerance    Sotalol Other (Failed)    Tikosyn [Dofetilide] Other (Pro-arrhythmia)      has a past medical history of Atrial fibrillation (HCC), Atrial flutter, paroxysmal (HCC), Dilated aortic root, Hyperlipidemia, Hypertension, Intestinal ulcer, LAE (left atrial enlargement), LVH (left ventricular hypertrophy), NSVT (nonsustained ventricular tachycardia) (HCC), and S/P MVR (mitral valve replacement).    has a current medication list which includes the following prescription(s): azithromycin, fenofibrate, metformin, zolpidem, citalopram, atorvastatin, vitamin d, losartan, glucose monitoring, lancets, blood glucose test strips, sotalol, warfarin, and aspirin.    BP Readings from Last 3 Encounters:   03/06/25 (!) 144/90   12/30/24 (!) 140/80   09/06/24 138/86     Pulse Readings from Last 3 Encounters:   03/06/25 69   12/30/24 71   09/06/24 62     No results found for: \"LABGLOM\", \"CREATININE\"CrCl cannot be calculated (No successful lab value found.).    No results found for: \"ALT\", \"AST\"    Lab Results   Component Value Date    LVEF 33 09/18/2020    LVEF 33 06/12/2020    LVEF 60 10/26/2018     Last Echo: 11/07/24  Echo complete W/O contrast  Narrative    Left Ventricle: There is mild increased wall thickness/hypertrophy.  Systolic function is low normal

## 2025-04-29 DIAGNOSIS — F51.01 PRIMARY INSOMNIA: ICD-10-CM

## 2025-04-29 RX ORDER — ZOLPIDEM TARTRATE 5 MG/1
5 TABLET ORAL NIGHTLY PRN
Qty: 30 TABLET | Refills: 2 | Status: SHIPPED | OUTPATIENT
Start: 2025-04-29 | End: 2025-07-28

## 2025-04-29 RX ORDER — ZOLPIDEM TARTRATE 10 MG/1
5 TABLET ORAL NIGHTLY PRN
Qty: 30 TABLET | Refills: 0 | OUTPATIENT
Start: 2025-04-29 | End: 2025-05-29

## 2025-04-29 RX ORDER — ZOLPIDEM TARTRATE 10 MG/1
5 TABLET ORAL NIGHTLY PRN
Qty: 30 TABLET | Refills: 0 | Status: CANCELLED | OUTPATIENT
Start: 2025-04-29 | End: 2025-05-29

## 2025-04-29 NOTE — TELEPHONE ENCOUNTER
zolpidem (AMBIEN) 10 MG tablet  Take 0.5 tablets by mouth nightly as needed for Sleep for up to 30 days. Max Daily Amount: 5 mg, Disp-30 tablet, R-0  Normal

## 2025-04-29 NOTE — TELEPHONE ENCOUNTER
Andrew Restrepo is calling to request a refill on the following medication(s):    Last Visit Date (If Applicable):  3/6/2025    Next Visit Date:    9/8/2025    Medication Request:  Requested Prescriptions     Pending Prescriptions Disp Refills    zolpidem (AMBIEN) 10 MG tablet 30 tablet 0     Sig: Take 0.5 tablets by mouth nightly as needed for Sleep for up to 30 days. Max Daily Amount: 5 mg

## 2025-05-01 ENCOUNTER — TELEPHONE (OUTPATIENT)
Age: 75
End: 2025-05-01

## 2025-05-01 NOTE — TELEPHONE ENCOUNTER
There was a new recommendation put out by the FDA stating that patient should only be on zolpidem 5 mg.  The pharmacies have been switching it automatically to 5 mg.  Okay to use 10 mg if he requires 10 mg and he is doing well with it.  He can take  two 5 mg tablets.  When he runs out he can call for another prescription of 10 mg.

## 2025-05-01 NOTE — TELEPHONE ENCOUNTER
Patient states that he normally takes Zolpidem 10 mg, dosage was decreased to 5 mg when sent to [facility]. Patient would like to know why dosage was decreased. Patient cannot sleep on 5 mg.     Patient did  the 5 mg at [facility] and took one 5 mg tablet. He did not sleep very well..    Please advise.

## 2025-05-16 DIAGNOSIS — F51.01 PRIMARY INSOMNIA: ICD-10-CM

## 2025-05-16 RX ORDER — ZOLPIDEM TARTRATE 10 MG/1
10 TABLET ORAL NIGHTLY PRN
Qty: 30 TABLET | Refills: 0 | Status: SHIPPED | OUTPATIENT
Start: 2025-05-16 | End: 2025-06-15

## 2025-05-16 NOTE — TELEPHONE ENCOUNTER
Patient said he takes 10MG because taking 5MG does not  work for him. Patient is out of medication    Dekeeley Restrepo is calling to request a refill on the following medication(s):    Last Visit Date (If Applicable):  9/6/2024    Next Visit Date:    9/8/2025    Medication Request:  Requested Prescriptions     Pending Prescriptions Disp Refills    zolpidem (AMBIEN) 10 MG tablet 30 tablet 0     Sig: Take 1 tablet by mouth nightly as needed for Sleep for up to 30 days. Max Daily Amount: 10 mg

## 2025-05-16 NOTE — TELEPHONE ENCOUNTER
zolpidem (AMBIEN) 10 MG tablet      Patient said he takes 10MG because taking 5MG does not  work for him.   Patient is out of medication

## 2025-06-11 DIAGNOSIS — F51.01 PRIMARY INSOMNIA: ICD-10-CM

## 2025-06-11 RX ORDER — ZOLPIDEM TARTRATE 10 MG/1
10 TABLET ORAL NIGHTLY PRN
Qty: 30 TABLET | Refills: 2 | Status: SHIPPED | OUTPATIENT
Start: 2025-06-11 | End: 2025-09-09

## 2025-06-11 NOTE — TELEPHONE ENCOUNTER
Andrew Restrepo is calling to request a refill on the following medication(s):    Last Visit Date (If Applicable):  3/6/2025    Next Visit Date:    9/8/2025    Medication Request:  Requested Prescriptions     Pending Prescriptions Disp Refills    zolpidem (AMBIEN) 10 MG tablet 30 tablet 0     Sig: Take 1 tablet by mouth nightly as needed for Sleep for up to 30 days. Max Daily Amount: 10 mg

## 2025-06-11 NOTE — TELEPHONE ENCOUNTER
Patient would like a refill of Zolpidem 10 mg. Takes one tablet at night.    Please send to [facility].

## 2025-06-26 ENCOUNTER — OFFICE VISIT (OUTPATIENT)
Age: 75
End: 2025-06-26

## 2025-06-26 VITALS
HEIGHT: 69 IN | DIASTOLIC BLOOD PRESSURE: 86 MMHG | HEART RATE: 76 BPM | SYSTOLIC BLOOD PRESSURE: 136 MMHG | TEMPERATURE: 98.1 F | BODY MASS INDEX: 29.47 KG/M2 | WEIGHT: 199 LBS | OXYGEN SATURATION: 97 %

## 2025-06-26 DIAGNOSIS — K43.9 VENTRAL HERNIA WITHOUT OBSTRUCTION OR GANGRENE: ICD-10-CM

## 2025-06-26 DIAGNOSIS — K21.9 GASTROESOPHAGEAL REFLUX DISEASE WITHOUT ESOPHAGITIS: ICD-10-CM

## 2025-06-26 DIAGNOSIS — Z85.820 HISTORY OF MALIGNANT MELANOMA: ICD-10-CM

## 2025-06-26 DIAGNOSIS — E55.9 VITAMIN D DEFICIENCY: ICD-10-CM

## 2025-06-26 DIAGNOSIS — I10 ESSENTIAL HYPERTENSION: ICD-10-CM

## 2025-06-26 DIAGNOSIS — E11.9 TYPE 2 DIABETES MELLITUS WITHOUT COMPLICATION, WITHOUT LONG-TERM CURRENT USE OF INSULIN (HCC): ICD-10-CM

## 2025-06-26 DIAGNOSIS — K21.9 GERD WITHOUT ESOPHAGITIS: ICD-10-CM

## 2025-06-26 DIAGNOSIS — F51.01 PRIMARY INSOMNIA: Primary | ICD-10-CM

## 2025-06-26 DIAGNOSIS — Z86.16 HISTORY OF COVID-19: ICD-10-CM

## 2025-06-26 DIAGNOSIS — Z95.2 HISTORY OF MITRAL VALVE REPLACEMENT: ICD-10-CM

## 2025-06-26 DIAGNOSIS — E78.2 MIXED HYPERLIPIDEMIA: ICD-10-CM

## 2025-06-26 DIAGNOSIS — I48.11 LONGSTANDING PERSISTENT ATRIAL FIBRILLATION (HCC): ICD-10-CM

## 2025-06-26 DIAGNOSIS — F41.9 ANXIETY: ICD-10-CM

## 2025-06-26 DIAGNOSIS — J32.0 CHRONIC MAXILLARY SINUSITIS: ICD-10-CM

## 2025-06-26 DIAGNOSIS — Z71.89 ACP (ADVANCE CARE PLANNING): ICD-10-CM

## 2025-06-26 DIAGNOSIS — Z79.01 ANTICOAGULANT LONG-TERM USE: ICD-10-CM

## 2025-06-26 DIAGNOSIS — Z95.810 AICD (AUTOMATIC CARDIOVERTER/DEFIBRILLATOR) PRESENT: ICD-10-CM

## 2025-06-26 DIAGNOSIS — R68.2 DRY MOUTH: ICD-10-CM

## 2025-06-26 RX ORDER — OMEPRAZOLE 20 MG/1
20 CAPSULE, DELAYED RELEASE ORAL
Qty: 90 CAPSULE | Refills: 1 | Status: SHIPPED | OUTPATIENT
Start: 2025-06-26

## 2025-06-26 ASSESSMENT — ENCOUNTER SYMPTOMS
EYE REDNESS: 0
WHEEZING: 0
SORE THROAT: 0
ABDOMINAL PAIN: 0
SHORTNESS OF BREATH: 0
SINUS PRESSURE: 0
EYE PAIN: 0
DIARRHEA: 0
CONSTIPATION: 0
SINUS PAIN: 0
COUGH: 0
RHINORRHEA: 0
NAUSEA: 0
BLOOD IN STOOL: 0
BACK PAIN: 0
VOMITING: 0

## 2025-06-26 NOTE — PROGRESS NOTES
planning)  Comments:  living will form given  16. Gastroesophageal reflux disease without esophagitis  Comments:  He notes halitosis.  No evidence of any sinus infection.  Trial of Prilosec 20 mg daily.  Avoid carbonated beverages.  He is not a mouth breather.  Gas-X  17. Dry mouth  Comments:  Continue Biotene, increase water intake, sugarless gum  Orders:  -     SSA 52 & 60 (Ro) (BEE) Ab, IgG; Future  18. Ventral hernia without obstruction or gangrene  Comments:  Midline above the umbilicus.  Not lifting weights.  Nontender, reducible.  Not an issue at this time.       Return in about 3 months (around 9/26/2025).    COMMUNICATION:       Electronically signed by David Grimaldo MD on 6/26/2025 at 4:44 PM

## 2025-06-30 DIAGNOSIS — F41.9 ANXIETY: ICD-10-CM

## 2025-07-01 RX ORDER — CITALOPRAM HYDROBROMIDE 40 MG/1
40 TABLET ORAL DAILY
Qty: 90 TABLET | Refills: 3 | Status: SHIPPED | OUTPATIENT
Start: 2025-07-01

## 2025-07-01 NOTE — TELEPHONE ENCOUNTER
Andrew Restrepo is calling to request a refill on the following medication(s):    Last Visit Date (If Applicable):  6/26/2025    Next Visit Date:    9/8/2025    Medication Request:  Requested Prescriptions     Pending Prescriptions Disp Refills    citalopram (CELEXA) 40 MG tablet [Pharmacy Med Name: Citalopram Hydrobromide 40 MG Oral Tablet] 90 tablet 0     Sig: Take 1 tablet by mouth daily